# Patient Record
Sex: MALE | Race: WHITE | Employment: OTHER | URBAN - METROPOLITAN AREA
[De-identification: names, ages, dates, MRNs, and addresses within clinical notes are randomized per-mention and may not be internally consistent; named-entity substitution may affect disease eponyms.]

---

## 2020-07-08 ENCOUNTER — APPOINTMENT (OUTPATIENT)
Dept: GENERAL RADIOLOGY | Facility: CLINIC | Age: 70
End: 2020-07-08
Attending: EMERGENCY MEDICINE
Payer: MEDICARE

## 2020-07-08 ENCOUNTER — HOSPITAL ENCOUNTER (OUTPATIENT)
Facility: CLINIC | Age: 70
Setting detail: OBSERVATION
Discharge: HOME | End: 2020-07-09
Attending: EMERGENCY MEDICINE | Admitting: HOSPITALIST
Payer: MEDICARE

## 2020-07-08 DIAGNOSIS — R55 SYNCOPE, UNSPECIFIED SYNCOPE TYPE: ICD-10-CM

## 2020-07-08 DIAGNOSIS — Z95.1 STATUS POST CORONARY ARTERY BYPASS GRAFT: ICD-10-CM

## 2020-07-08 DIAGNOSIS — Z79.01 LONG TERM CURRENT USE OF ANTICOAGULANT THERAPY: ICD-10-CM

## 2020-07-08 PROBLEM — E11.8 CONTROLLED TYPE 2 DIABETES MELLITUS WITH COMPLICATION, WITHOUT LONG-TERM CURRENT USE OF INSULIN (H): Status: ACTIVE | Noted: 2017-06-08

## 2020-07-08 LAB
ANION GAP SERPL CALCULATED.3IONS-SCNC: 4 MMOL/L (ref 3–14)
BASOPHILS # BLD AUTO: 0 10E9/L (ref 0–0.2)
BASOPHILS NFR BLD AUTO: 0.3 %
BUN SERPL-MCNC: 22 MG/DL (ref 7–30)
CALCIUM SERPL-MCNC: 9.4 MG/DL (ref 8.5–10.1)
CHLORIDE SERPL-SCNC: 109 MMOL/L (ref 94–109)
CO2 SERPL-SCNC: 24 MMOL/L (ref 20–32)
CREAT SERPL-MCNC: 1.35 MG/DL (ref 0.66–1.25)
DIFFERENTIAL METHOD BLD: ABNORMAL
EOSINOPHIL # BLD AUTO: 0.1 10E9/L (ref 0–0.7)
EOSINOPHIL NFR BLD AUTO: 1.3 %
ERYTHROCYTE [DISTWIDTH] IN BLOOD BY AUTOMATED COUNT: 13.3 % (ref 10–15)
GFR SERPL CREATININE-BSD FRML MDRD: 52 ML/MIN/{1.73_M2}
GLUCOSE SERPL-MCNC: 141 MG/DL (ref 70–99)
HCT VFR BLD AUTO: 47.8 % (ref 40–53)
HGB BLD-MCNC: 15.6 G/DL (ref 13.3–17.7)
IMM GRANULOCYTES # BLD: 0 10E9/L (ref 0–0.4)
IMM GRANULOCYTES NFR BLD: 0.3 %
INR PPP: 3 (ref 0.86–1.14)
INTERPRETATION ECG - MUSE: NORMAL
LYMPHOCYTES # BLD AUTO: 1.2 10E9/L (ref 0.8–5.3)
LYMPHOCYTES NFR BLD AUTO: 18.5 %
MCH RBC QN AUTO: 29.4 PG (ref 26.5–33)
MCHC RBC AUTO-ENTMCNC: 32.6 G/DL (ref 31.5–36.5)
MCV RBC AUTO: 90 FL (ref 78–100)
MONOCYTES # BLD AUTO: 0.5 10E9/L (ref 0–1.3)
MONOCYTES NFR BLD AUTO: 8.1 %
NEUTROPHILS # BLD AUTO: 4.4 10E9/L (ref 1.6–8.3)
NEUTROPHILS NFR BLD AUTO: 71.5 %
NRBC # BLD AUTO: 0 10*3/UL
NRBC BLD AUTO-RTO: 0 /100
PLATELET # BLD AUTO: 126 10E9/L (ref 150–450)
POTASSIUM SERPL-SCNC: 4.4 MMOL/L (ref 3.4–5.3)
RBC # BLD AUTO: 5.3 10E12/L (ref 4.4–5.9)
SODIUM SERPL-SCNC: 137 MMOL/L (ref 133–144)
TROPONIN I SERPL-MCNC: <0.015 UG/L (ref 0–0.04)
WBC # BLD AUTO: 6.2 10E9/L (ref 4–11)

## 2020-07-08 PROCEDURE — 99220 ZZC INITIAL OBSERVATION CARE,LEVL III: CPT | Performed by: PHYSICIAN ASSISTANT

## 2020-07-08 PROCEDURE — 99285 EMERGENCY DEPT VISIT HI MDM: CPT | Mod: 25

## 2020-07-08 PROCEDURE — 96360 HYDRATION IV INFUSION INIT: CPT

## 2020-07-08 PROCEDURE — 25800030 ZZH RX IP 258 OP 636: Performed by: EMERGENCY MEDICINE

## 2020-07-08 PROCEDURE — 93005 ELECTROCARDIOGRAM TRACING: CPT

## 2020-07-08 PROCEDURE — 85610 PROTHROMBIN TIME: CPT | Performed by: EMERGENCY MEDICINE

## 2020-07-08 PROCEDURE — 84484 ASSAY OF TROPONIN QUANT: CPT | Mod: 91 | Performed by: PHYSICIAN ASSISTANT

## 2020-07-08 PROCEDURE — 96361 HYDRATE IV INFUSION ADD-ON: CPT

## 2020-07-08 PROCEDURE — C9803 HOPD COVID-19 SPEC COLLECT: HCPCS

## 2020-07-08 PROCEDURE — 25000132 ZZH RX MED GY IP 250 OP 250 PS 637: Performed by: PHYSICIAN ASSISTANT

## 2020-07-08 PROCEDURE — 25800030 ZZH RX IP 258 OP 636: Performed by: PHYSICIAN ASSISTANT

## 2020-07-08 PROCEDURE — 80048 BASIC METABOLIC PNL TOTAL CA: CPT | Performed by: EMERGENCY MEDICINE

## 2020-07-08 PROCEDURE — 71046 X-RAY EXAM CHEST 2 VIEWS: CPT

## 2020-07-08 PROCEDURE — U0003 INFECTIOUS AGENT DETECTION BY NUCLEIC ACID (DNA OR RNA); SEVERE ACUTE RESPIRATORY SYNDROME CORONAVIRUS 2 (SARS-COV-2) (CORONAVIRUS DISEASE [COVID-19]), AMPLIFIED PROBE TECHNIQUE, MAKING USE OF HIGH THROUGHPUT TECHNOLOGIES AS DESCRIBED BY CMS-2020-01-R: HCPCS | Performed by: EMERGENCY MEDICINE

## 2020-07-08 PROCEDURE — 84484 ASSAY OF TROPONIN QUANT: CPT | Performed by: EMERGENCY MEDICINE

## 2020-07-08 PROCEDURE — 36415 COLL VENOUS BLD VENIPUNCTURE: CPT | Performed by: PHYSICIAN ASSISTANT

## 2020-07-08 PROCEDURE — G0378 HOSPITAL OBSERVATION PER HR: HCPCS

## 2020-07-08 PROCEDURE — 85025 COMPLETE CBC W/AUTO DIFF WBC: CPT | Performed by: EMERGENCY MEDICINE

## 2020-07-08 RX ORDER — SODIUM CHLORIDE 9 MG/ML
INJECTION, SOLUTION INTRAVENOUS CONTINUOUS
Status: ACTIVE | OUTPATIENT
Start: 2020-07-08 | End: 2020-07-09

## 2020-07-08 RX ORDER — ONDANSETRON 2 MG/ML
4 INJECTION INTRAMUSCULAR; INTRAVENOUS EVERY 6 HOURS PRN
Status: DISCONTINUED | OUTPATIENT
Start: 2020-07-08 | End: 2020-07-09 | Stop reason: HOSPADM

## 2020-07-08 RX ORDER — GLIPIZIDE 2.5 MG/1
2.5 TABLET, EXTENDED RELEASE ORAL DAILY
Status: DISCONTINUED | OUTPATIENT
Start: 2020-07-09 | End: 2020-07-09 | Stop reason: HOSPADM

## 2020-07-08 RX ORDER — ASPIRIN 81 MG/1
81 TABLET ORAL DAILY
COMMUNITY

## 2020-07-08 RX ORDER — NICOTINE POLACRILEX 4 MG
15-30 LOZENGE BUCCAL
Status: DISCONTINUED | OUTPATIENT
Start: 2020-07-08 | End: 2020-07-09 | Stop reason: HOSPADM

## 2020-07-08 RX ORDER — TERAZOSIN 2 MG/1
2 CAPSULE ORAL AT BEDTIME
COMMUNITY

## 2020-07-08 RX ORDER — WARFARIN SODIUM 2.5 MG/1
3.75 TABLET ORAL AT BEDTIME
COMMUNITY

## 2020-07-08 RX ORDER — LISINOPRIL 20 MG/1
20 TABLET ORAL DAILY
COMMUNITY

## 2020-07-08 RX ORDER — TERAZOSIN 1 MG/1
2 CAPSULE ORAL AT BEDTIME
Status: DISCONTINUED | OUTPATIENT
Start: 2020-07-08 | End: 2020-07-09 | Stop reason: HOSPADM

## 2020-07-08 RX ORDER — GLIPIZIDE 2.5 MG/1
2.5 TABLET, EXTENDED RELEASE ORAL DAILY
COMMUNITY

## 2020-07-08 RX ORDER — ROSUVASTATIN CALCIUM 20 MG/1
40 TABLET, COATED ORAL EVERY EVENING
Status: DISCONTINUED | OUTPATIENT
Start: 2020-07-08 | End: 2020-07-09 | Stop reason: HOSPADM

## 2020-07-08 RX ORDER — ASPIRIN 81 MG/1
81 TABLET ORAL DAILY
Status: DISCONTINUED | OUTPATIENT
Start: 2020-07-09 | End: 2020-07-09 | Stop reason: HOSPADM

## 2020-07-08 RX ORDER — ATORVASTATIN CALCIUM 40 MG/1
40 TABLET, FILM COATED ORAL AT BEDTIME
COMMUNITY

## 2020-07-08 RX ORDER — WARFARIN SODIUM 2.5 MG/1
TABLET ORAL
COMMUNITY
Start: 2020-06-15 | End: 2020-07-08

## 2020-07-08 RX ORDER — ATORVASTATIN CALCIUM 40 MG/1
40 TABLET, FILM COATED ORAL AT BEDTIME
Status: DISCONTINUED | OUTPATIENT
Start: 2020-07-08 | End: 2020-07-08

## 2020-07-08 RX ORDER — WARFARIN SODIUM 2.5 MG/1
2.5 TABLET ORAL AT BEDTIME
COMMUNITY

## 2020-07-08 RX ORDER — NITROGLYCERIN 0.4 MG/1
0.4 TABLET SUBLINGUAL EVERY 5 MIN PRN
Status: DISCONTINUED | OUTPATIENT
Start: 2020-07-08 | End: 2020-07-09 | Stop reason: HOSPADM

## 2020-07-08 RX ORDER — LISINOPRIL 20 MG/1
20 TABLET ORAL DAILY
Status: DISCONTINUED | OUTPATIENT
Start: 2020-07-09 | End: 2020-07-09 | Stop reason: HOSPADM

## 2020-07-08 RX ORDER — DEXTROSE MONOHYDRATE 25 G/50ML
25-50 INJECTION, SOLUTION INTRAVENOUS
Status: DISCONTINUED | OUTPATIENT
Start: 2020-07-08 | End: 2020-07-09 | Stop reason: HOSPADM

## 2020-07-08 RX ORDER — ACETAMINOPHEN 325 MG/1
650 TABLET ORAL EVERY 4 HOURS PRN
Status: DISCONTINUED | OUTPATIENT
Start: 2020-07-08 | End: 2020-07-09 | Stop reason: HOSPADM

## 2020-07-08 RX ORDER — ONDANSETRON 4 MG/1
4 TABLET, ORALLY DISINTEGRATING ORAL EVERY 6 HOURS PRN
Status: DISCONTINUED | OUTPATIENT
Start: 2020-07-08 | End: 2020-07-09 | Stop reason: HOSPADM

## 2020-07-08 RX ORDER — LIDOCAINE 40 MG/G
CREAM TOPICAL
Status: DISCONTINUED | OUTPATIENT
Start: 2020-07-08 | End: 2020-07-09

## 2020-07-08 RX ADMIN — SODIUM CHLORIDE 500 ML: 9 INJECTION, SOLUTION INTRAVENOUS at 13:21

## 2020-07-08 RX ADMIN — WARFARIN SODIUM 3.5 MG: 2.5 TABLET ORAL at 21:44

## 2020-07-08 RX ADMIN — SODIUM CHLORIDE: 9 INJECTION, SOLUTION INTRAVENOUS at 16:54

## 2020-07-08 RX ADMIN — SODIUM CHLORIDE 500 ML: 9 INJECTION, SOLUTION INTRAVENOUS at 14:01

## 2020-07-08 RX ADMIN — ROSUVASTATIN CALCIUM 40 MG: 20 TABLET, FILM COATED ORAL at 21:43

## 2020-07-08 RX ADMIN — TERAZOSIN HYDROCHLORIDE ANHYDROUS 2 MG: 1 CAPSULE ORAL at 21:44

## 2020-07-08 ASSESSMENT — ENCOUNTER SYMPTOMS
PALPITATIONS: 0
VOMITING: 0
SHORTNESS OF BREATH: 0
DIAPHORESIS: 1
ACTIVITY CHANGE: 1
SEIZURES: 0
HEADACHES: 0

## 2020-07-08 NOTE — PLAN OF CARE
PRIMARY DIAGNOSIS: SYNCOPE  OUTPATIENT/OBSERVATION GOALS TO BE MET BEFORE DISCHARGE:  1. Orthostatic performed: Yes:          Lying Orthostatic BP: 138/66         Sitting Orthostatic BP: 131/116         Standing Orthostatic BP: 139/72     2. Diagnostic testing complete & at baseline neurologic testing: N/A    3. Cleared by consultants (if involved): N/A    4. Interpretation of cardiac rhythm per telemetry tech: SR w/1st degree AVB    5. Tolerating adequate PO diet and medications: Yes    6. Return to near baseline physical activity or neurologic status: Yes    Discharge Planner Nurse   Safe discharge environment identified: Yes  Barriers to discharge: No       Entered by: Lexis Gallardo 07/08/2020 6:52 PM     Please review provider order for any additional goals.   Nurse to notify provider when observation goals have been met and patient is ready for discharge.    VSS, A/O. SBA.  NS infusing @ 100ml/h.  Echo ordered.  Trops negative so far, next one at 2100.  Pt denies CP/SOB/Pain/Nausea.  Denies dizziness/lightheadedness.  Tele- SR with 1st DAVB.  Pt typically uses CPAP at HS, will use NC tonight.

## 2020-07-08 NOTE — H&P
[facility] Outpatient / Observation Unit  History and Physical Exam     Carlos Puente II MRN# [id number]   YOB: 1950 Age: 70 year old      Date of Admission:  7/8/2020    Primary care provider: No primary care provider on file.          Assessment   Carlos Puente II is a 70 year old male with a PMH significant for DM, HTN, HLP, CAD s/p 3v CABG in 2014, GERD, CKD, hx of PE/DVT on coumadin and hx of ALBERTA, who presented to the Emergency Room with an episode of syncope.   Work up in the ED reveals: VSS. T 99. BMP shows baseline Cr of 1.35 otherwise unremarkable. Troponin is undetectable. CBC is unremarkable. INR is 3.0. EKG shows SR with 1st degree AV block, RBBB and poss inferior infarct. CXR shows mild vascular congestion, otherwise negative for acute process.   Patient will be registered to Observation for further work-up and evaluation.     1. Syncope - episode yesterday and felt off today. Syncopal episode occurred after working in garage/heat for several hours, came in for a cool shower, asymptomatic at that time. Sat down and drank cold water, then passed out. Denies preceding chest pain, SOB, palpations, or lightheadedness. Felt fine later in the evening. Today, felt off but didn't pass out. Will monitor on tele for arrhythmia, will trend troponins and get an echocardiogram to assess structure and function. If work up is unremarkable, recommend cardiac event monitor on discharge and consider follow up with primary cardiologist for further recommendations.   2. DM - last HgbA1c was 5.9 on 6/12/20. Managed on glipizide, resume  3. HTN - BPs are stable, orthostatics on admission were negative. On Hytrin and lisinopril at home, resume with parameters.  4. CKD - baseline Cr is ~1.5, today it 1.35. avoid nephrotoxins if able  5. Hx of PE/DVT - hx of massive PE in 2002, has been anticoagulated with coumadin since. Continue coumadin per pharmacy   6. HLP - resume statin  7. GERD - resume PPI  8. ALBERTA -  uses CPAP  9. COVID-19 - test done for admission during pandemic. Pending          Plan     1. Registered to Observation  2. Continue telemetry monitoring  3. Serial troponins   4. IV hydration with Normal saline, @, 100 ml/hr for 10 hrs  5. Obtain ECHO to evaluate for valvular dysfunction and assess for LV function  6. DVT prophylaxis: pt at low risk, encourage ambulation                   Chief Complaint:   Syncopal episode         History of Present Illness:   Carlos Puente II is a 70 year old male with a PMH significant for DM, HTN, HLP, CAD s/p 3v CABG in 2014, GERD, CKD, hx of PE/DVT on coumadin and hx of ALBERTA, who presented to the Emergency Room with an episode of syncope. Pt reports yesterday he was working in the garage for several hours. He was profusely sweating and hot but otherwise felt fine. He came in to take a cool shower to cool down and felt fine throughout this. He sat down and drank cold water and passed out. Per ED note, wife reports he was out for several minutes. He states he woke up excessively sweaty but otherwise felt fine. He denies preceding or following chest pain, SOB, palpitations, lightheadedness or nausea. He states he felt back to normal later in the evening. Today, he notes feeling off again with diaphoresis and his wife states he was pale. This prompted them to come to the ED. He again denies chest pain, SOB or palpations. He states in the days prior, he denies fever, chills, chest pain, cough, SOB, decreasing exercise tolerance, abd pain, nausea, vomiting, diarrhea or dysuria. He notes hx of 3v CABG in 2014 and hx of massive PE in 2002 at which time, he also suffered cardiac arrest due to this in the ED.               Past Medical History:   HTN  HLP  CAD  CKD  DVT/PE  GERD  ALBERTA          Past Surgical History:   3v CABG in 2014  R LUIS M          Social History:     Social History     Socioeconomic History     Marital status:      Spouse name: Not on file     Number of  "children: Not on file     Years of education: Not on file     Highest education level: Not on file   Occupational History     Not on file   Social Needs     Financial resource strain: Not on file     Food insecurity     Worry: Not on file     Inability: Not on file     Transportation needs     Medical: Not on file     Non-medical: Not on file   Tobacco Use     Smoking status: Former Smoker     Last attempt to quit: 2000     Years since quittin.5     Smokeless tobacco: Never Used   Substance and Sexual Activity     Alcohol use: Yes     Alcohol/week: 7.0 standard drinks     Types: 7 Cans of beer per week     Drug use: Never     Sexual activity: Not on file   Lifestyle     Physical activity     Days per week: Not on file     Minutes per session: Not on file     Stress: Not on file   Relationships     Social connections     Talks on phone: Not on file     Gets together: Not on file     Attends Alevism service: Not on file     Active member of club or organization: Not on file     Attends meetings of clubs or organizations: Not on file     Relationship status: Not on file     Intimate partner violence     Fear of current or ex partner: Not on file     Emotionally abused: Not on file     Physically abused: Not on file     Forced sexual activity: Not on file   Other Topics Concern     Not on file   Social History Narrative     Not on file               Family History:   Father - HTN, CVA  Brother - HTN         Allergies:      Allergies   Allergen Reactions     Atorvastatin      Other reaction(s): Muscle Weakness  PER PATIENT,WAS TOLD IT WAS EATING HIS MUSCLES , classic side effect of statins not an allergy     Penicillins      Other reaction(s): *Unknown - Childhood Rxn  Pt states,  \" he was told that when he was born he got a dose and had some stanley reaction;  Was told he was allergic to it.\"     Simvastatin Other (See Comments)     Weakness \" couldn't get out of chair; no muscle strength\" pt states. Side effect " NOT an allergy               Medications:     Prior to Admission medications    Medication Sig Last Dose Taking? Auth Provider   aspirin 81 MG EC tablet Take 81 mg by mouth daily 7/8/2020 at am Yes Unknown, Entered By History   atorvastatin (LIPITOR) 40 MG tablet Take 40 mg by mouth At Bedtime 7/7/2020 at pm Yes Unknown, Entered By History   blood glucose (NO BRAND SPECIFIED) test strip USE AS DIRECTED  Yes Reported, Patient   cholecalciferol (VITAMIN D3) 25 mcg (1000 units) capsule Take 1 capsule by mouth daily 7/8/2020 at am Yes Unknown, Entered By History   glipiZIDE (GLUCOTROL XL) 2.5 MG 24 hr tablet Take 2.5 mg by mouth daily 7/8/2020 at am Yes Unknown, Entered By History   lisinopril (ZESTRIL) 20 MG tablet Take 20 mg by mouth daily 7/8/2020 at am Yes Unknown, Entered By History   omeprazole (PRILOSEC) 20 MG DR capsule Take 20 mg by mouth daily 7/8/2020 at am Yes Unknown, Entered By History   terazosin (HYTRIN) 2 MG capsule Take 2 mg by mouth At Bedtime 7/7/2020 at pm Yes Unknown, Entered By History   warfarin ANTICOAGULANT (COUMADIN) 2.5 MG tablet Take 2.5 mg by mouth At Bedtime On Mondays and Fridays 7/6/2020 Yes Unknown, Entered By History   warfarin ANTICOAGULANT (COUMADIN) 2.5 MG tablet Take 3.75 mg by mouth At Bedtime All other days 7/7/2020 at pm Yes Unknown, Entered By History              Review of Systems:   A Comprehensive greater than 10 system review of systems was carried out.  Pertinent positives and negatives are noted above.  Otherwise negative for contributory information.     CV: NEGATIVE for chest pain, palpitations or peripheral edema  C: NEGATIVE for fever, chills, change in weight  E/M: NEGATIVE for ear, mouth and throat problems  R: NEGATIVE for significant cough or SOB             Physical Exam:   Blood pressure (!) 143/86, pulse 55, temperature 99  F (37.2  C), temperature source Oral, resp. rate 12, weight 96.2 kg (212 lb), SpO2 99 %.  ORTHOSTATIC BP: negative    GENERAL:   Comfortable.  PSYCH: pleasant, oriented, No acute distress.  HEENT:  Atraumatic, normocephalic. Normal conjunctiva, normal hearing, and oropharynx is normal.  NECK:  Supple, no neck vein distention.  HEART:  Normal S1, S2 with no murmur, no pericardial rub, gallops or S3 or S4.  LUNGS:  Clear to auscultation, normal Respiratory effort. No wheezing, rales or ronchi.  GI:  Soft, normal bowel sounds. Non-tender, non distended.   EXTREMITIES:  No pedal edema, +2 pulses bilateral and equal.  SKIN:  Dry to touch, No rash, wound or ulcerations.  NEUROLOGIC:  CN 2-12 intact, BL 5/5 symmetric upper and lower extremity strength, sensation is intact with no focal deficits.             Data:     EKG demonstrates:  Sinus Rhythm with 1st degree AV block, Right Bundle Branch Block, poss inferior infarct    Recent Labs   Lab 07/08/20  1249   WBC 6.2   HGB 15.6   HCT 47.8   MCV 90   *     Recent Labs   Lab 07/08/20  1249      POTASSIUM 4.4   CHLORIDE 109   CO2 24   ANIONGAP 4   *   BUN 22   CR 1.35*   GFRESTIMATED 52*   GFRESTBLACK 61   ANABELL 9.4     Recent Labs   Lab 07/08/20  1249   INR 3.00*     Recent Labs   Lab 07/08/20  1249   TROPI <0.015       Recent Results (from the past 48 hour(s))   XR Chest 2 Views    Narrative    XR CHEST 2 VW 7/8/2020 1:09 PM    HISTORY: syncope    COMPARISON: None.      Impression    IMPRESSION: Mild pulmonary venous congestion. No consolidation. No  pleural effusions. No pneumothorax. Normal cardiac size. Median  sternotomy.    MD Olamide VELASQUEZ PA-C

## 2020-07-08 NOTE — ED PROVIDER NOTES
"  History   Chief Complaint:  Loss of Consciousness     The history is provided by the patient and the spouse.      Carlos Puente II is a 70 year old male with history of type 2 diabetes mellitus, hypertension, hyperlipidemia, Chronic Kidney Disease, and DVT/pulmonary embolism on Warfarin status post CABG who presents with loss of consciousness. The patient was out in the garage yesterday for several hours. He came into the house at 1430 and took a shower. He then sat down to sip water when he suddenly had a syncopal event. The wife notes prior to collapsing he states that he was making noises like he was going to vomit and then he collapsed. She notes that he was pale, heavily diaphoretic, and was unconscious  for about 4 minutes. He was in a chair and did not fall or sustain injury.  The patient denies chest pain, palpitations, or shortness of breath prior to the event. He also denies headache or vomiting today, but the wife noted the patient had another near syncopal episode today where he became very pale and \"spacey.\" Carlos did have a syncopal event 2 years ago and had an associated seizure witnessed by EMS. The patient was seen at [place] at that time. He is anticoagulated on Warfarin after multiple blood clots and had several episodes of epistaxis in the last several weeks which is unusual for him.    Allergies:  Atorvastatin  Penicillins  Simvastatin    Medications:   Warfarin    Past Medical History:    Type 2 diabetes mellitus   Obstructive sleep apnea  Erythema migrans   Pulmonary embolism  DVT  NSTEMI  Osteoarthritis of hip  Neoplasm of colon   Chronic Kidney Disease  Hypertension  Hyperlipidemia      Past Surgical History:    CABG     Family History:    History reviewed. No pertinent family history.    Social History:  The patient was accompanied to the ED by wife.  Smoking Status: former smoker  Smokeless Tobacco: Never Used  Alcohol Use: Yes  Drug Use: Never    Review of Systems   Constitutional: " Positive for activity change and diaphoresis.   Respiratory: Negative for shortness of breath.    Cardiovascular: Negative for chest pain and palpitations.   Gastrointestinal: Negative for vomiting.   Skin: Positive for pallor.   Neurological: Negative for seizures and headaches.        Loss of consciousness    All other systems reviewed and are negative.    Physical Exam     Patient Vitals for the past 24 hrs:   BP Temp Temp src Pulse Heart Rate Resp SpO2 Weight   07/08/20 1400 (!) 143/86 -- -- 55 55 12 99 % --   07/08/20 1300 119/75 -- -- 56 55 16 98 % --   07/08/20 1228 (!) 166/79 99  F (37.2  C) Oral 78 78 20 99 % 96.2 kg (212 lb)       Physical Exam    Eyes:               Sclera white; Pupils are equal and round  ENT:                External ears and nares normal  CV:                  Rate as above with regular rhythm                           Sternotomy scar                          No BLE edema  Resp:               Breath sounds clear and equal bilaterally  GI:                   Abdomen is soft, non-tender  MS:                  Moves all extremities  Skin:                Warm and dry  Neuro:             Speech is normal and fluent. Alert.    Emergency Department Course   ECG:  ECG taken at 1236, ECG read at 1236  Sinus rhythm with 1st degree AV block  Right bundle branch block   Possible inferior infarct, age undetermined  Abnormal ECG  Rate 63 bpm. ND interval 266 ms. QRS duration 130 ms. QT/QTc 416/425 ms. P-R-T axes 52 48 -13.    Laboratory:  Laboratory findings were communicated with the patient who voiced understanding of the findings.    CBC: WBC 6.2, HGB 15.6, (L)  BMP: glucose 141(H), Creatinine 1.35(H), GFR estimate 52(L) o/w WNL  Troponin (Collected 1249): <0.015  INR: 3.00(H)    COVID19 Virus PCR by nasopharyngeal swab pending     Imaging   Imaging findings were communicated with the patient who voiced understanding of the findings.    XR Chest 2 Views   IMPRESSION: Mild pulmonary venous  congestion. No consolidation. No   pleural effusions. No pneumothorax. Normal cardiac size. Median   sternotomy.   Reading per radiology     Interventions:  1321  mL IV  1401  mL IV    Emergency Department Course:  Nursing notes and vitals reviewed.    1240 I performed an exam of the patient as documented above.     IV was inserted and blood was drawn for laboratory testing, results above.     1255 Orthostatics:   Lying Orthostatic BP: 138/66 Lying Orthostatic Pulse: 56 bpm    Sitting Orthostatic BP: 131/116 Sitting Orthostatic Pulse: 64 bpm    Standing Orthostatic BP: 139/72 Standing Orthostatic Pulse: 69 bpm     The patient was sent for a XR Chest while in the emergency department, results above.      1335 I rechecked the patient. Explained findings to the Patient.    1418 I spoke with Olamide Art for Dr. Caballero of the Hospitalist service from [facility] regarding patient's presentation, findings, and plan of care.     Findings and plan explained to the Patient who consents to admission. Discussed the patient with Dr. Caballero, who will admit the patient to a observation bed for further monitoring, evaluation, and treatment.     Impression & Plan    Covid-19  Carlos Puente II was evaluated during a global COVID-19 pandemic, which necessitated consideration that the patient might be at risk for infection with the SARS-CoV-2 virus that causes COVID-19.   Applicable protocols for evaluation were followed during the patient's care.   COVID-19 was considered as part of the patient's evaluation. The plan for testing is:  a test was obtained during this visit.    Medical Decision Making:  Carlos Puente II is a 70 year old male who presents to the emergency department today for evaluation of syncope yesterday and near syncope today. An isolated event heat exposure or dehydration was a consideration except for the fact that symptoms happened well after he had returned to a cool environment and  recurred a day later. Blood work shows no evidence of anemia, electrolyte abnormalities, or renal insufficiency. There was no evidence of dysrhythmia or acute coronary syndrome. There is concern for possibility of cardiac dysrhythmia and underlying cardiac etiology with these symptoms, so further cardiac evaluation in the hospital was recommended. He and his wife have concerns about observation stay and are checking with their insurance after the discussion I had with them.  Admission arranged.    Diagnosis:    ICD-10-CM    1. Syncope, unspecified syncope type  R55    2. Status post coronary artery bypass graft  Z95.1    3. Long term current use of anticoagulant therapy  Z79.01        Disposition:   The patient is admitted into the care of Dr. Caballero.    Scribe Disclosure:  I, Lesli Peoples, am serving as a scribe at 12:36 PM on 7/8/2020 to document services personally performed by Susana Alvarado MD based on my observations and the provider's statements to me.   [facility] EMERGENCY DEPARTMENT       Susana Alvarado MD  07/08/20 [id number]

## 2020-07-08 NOTE — ED TRIAGE NOTES
Presents with syncopal episode yesterday after working in the garage for several hours. Spouse reports pt became pale and diaphoretic and unconscious for about 4 minutes before he woke up. Pt presents today pale with increased weakness. ABCs intact. VSS on RA.

## 2020-07-08 NOTE — ED NOTES
"[facility]  ED Nurse Handoff Report    Carlos Puente II is a 70 year old male   ED Chief complaint: Loss of Consciousness  . ED Diagnosis:   Final diagnoses:   Syncope, unspecified syncope type   Status post coronary artery bypass graft   Long term current use of anticoagulant therapy     Allergies:   Allergies   Allergen Reactions     Atorvastatin      Other reaction(s): Muscle Weakness  PER PATIENT,WAS TOLD IT WAS EATING HIS MUSCLES , classic side effect of statins not an allergy     Penicillins      Other reaction(s): *Unknown - Childhood Rxn  Pt states,  \" he was told that when he was born he got a dose and had some stanley reaction;  Was told he was allergic to it.\"     Simvastatin Other (See Comments)     Weakness \" couldn't get out of chair; no muscle strength\" pt states. Side effect NOT an allergy       Code Status: Full Code  Activity level - Baseline/Home:  Independent. Activity Level - Current:   Stand by Assist. Lift room needed: No. Bariatric: No   Needed: No   Isolation: No. Infection: Not Applicable.     Vital Signs:   Vitals:    07/08/20 1228 07/08/20 1300   BP: (!) 166/79 119/75   Pulse: 78 56   Resp: 20 16   Temp: 99  F (37.2  C)    TempSrc: Oral    SpO2: 99% 98%   Weight: 96.2 kg (212 lb)        Cardiac Rhythm:  ,      Pain level: 0-10 Pain Scale: 0  Patient confused: No. Patient Falls Risk: Yes.   Elimination Status: Has voided   Patient Report - Initial Complaint: LOC, syncopyx2. Focused Assessment:     12:26 ED Triage Notes Filed Presents with syncopal episode yesterday after working in the garage for several hours. Spouse reports pt became pale and diaphoretic and unconscious for about 4 minutes before he woke up. Pt presents today pale with increased weakness. ABCs intact. VSS on RA.    Carlos Puente II is a 70 year old male with history of type 2 diabetes mellitus, hypertension, hyperlipidemia, Chronic Kidney Disease, and DVT/pulmonary embolism on Warfarin status " "post CABG who presents with loss of consciousness. The patient was out in the garage yesterday for several hours. He came into the house at 1430 and took a shower. He then sat down to sip water when he suddenly had a syncopal event. The wife notes prior to collapsing he states that he was making noises like he was going to vomit and then he collapsed. She notes that he was pale, heavily diaphoretic, and was unconscious  for about 4 minutes. The patient denies chest pain, palpitations, or shortness of breath prior to the event. He also denies headache or vomiting today, but the wife noted the patient had another near syncopal episode today where he became very pale and \"spacy.\" Carlos did have a syncopal event 2 years ago and had an associated seizure witnessed by EMS. The patient was seen at [facility] at that time. He is anticoagulated on Warfarin after multiple blood clots and had several episodes of epistaxis in the last several weeks which is unusual for him   Denies SOB,   2 LOC at home, hx Full cardiac arrest, MI and CABG.   LS clear, Denies CP. On coumadin      Eyes:               Sclera white; Pupils are equal and round  ENT:                External ears and nares normal  CV:                  Rate as above with regular rhythm                           Sternotomy scar                          No BLE edema  Resp:               Breath sounds clear and equal bilaterally  GI:                   Abdomen is soft, non-tender  MS:                  Moves all extremities  Skin:                Warm and dry  Neuro:             Speech is normal and fluent. Alert.                         Tests Performed: labs, imaging. Abnormal Results:   Labs Ordered and Resulted from Time of ED Arrival Up to the Time of Departure from the ED   CBC WITH PLATELETS DIFFERENTIAL - Abnormal; Notable for the following components:       Result Value    Platelet Count 126 (*)     All other components within normal limits   INR - Abnormal; Notable for " the following components:    INR 3.00 (*)     All other components within normal limits   BASIC METABOLIC PANEL - Abnormal; Notable for the following components:    Glucose 141 (*)     Creatinine 1.35 (*)     GFR Estimate 52 (*)     All other components within normal limits   TROPONIN I   PERIPHERAL IV CATHETER   ORTHOSTATIC BLOOD PRESSURE AND PULSE   CARDIAC CONTINUOUS MONITORING     XR Chest 2 Views   Final Result   IMPRESSION: Mild pulmonary venous congestion. No consolidation. No   pleural effusions. No pneumothorax. Normal cardiac size. Median   sternotomy.      MOUSTAPHA PALMA MD        .   Treatments provided: monitoring, fluids  Family Comments: spouse at bedside  OBS brochure/video discussed/provided to patient:  Yes  ED Medications:   Medications   0.9% sodium chloride BOLUS (500 mLs Intravenous New Bag 7/8/20 1321)   0.9% sodium chloride BOLUS (has no administration in time range)     Drips infusing:  No  For the majority of the shift, the patient's behavior Green. Interventions performed were n/a.    Sepsis treatment initiated: No       ED Nurse Name/Phone Number: Chhaya Ellis RN,   1:55 PM     RECEIVING UNIT ED HANDOFF REVIEW    Above ED Nurse Handoff Report was reviewed: Yes  Reviewed by: Lexis Gallardo RN on July 8, 2020 at 3:16 PM

## 2020-07-08 NOTE — PHARMACY-ANTICOAGULATION SERVICE
Clinical Pharmacy - Warfarin Dosing Consult     Pharmacy has been consulted to manage this patient s warfarin therapy.  Indication: DVT/ PE Treatment  Therapy Goal: INR 2-3  Warfarin Prior to Admission: Yes  Warfarin PTA Regimen: 2.5 mg M,F and 3.75 mg ROW  Significant drug interactions: Aspirin  Dose Comments: INR 3.0, warfarin 3.5 mg today    INR   Date Value Ref Range Status   07/08/2020 3.00 (H) 0.86 - 1.14 Final       Recommend warfarin 3.5 mg today.  Pharmacy will monitor Carlos CHRISTIANO QuintanaKwame II daily and order warfarin doses to achieve specified goal.      Please contact pharmacy as soon as possible if the warfarin needs to be held for a procedure or if the warfarin goals change.

## 2020-07-08 NOTE — PHARMACY-ADMISSION MEDICATION HISTORY
Admission medication history interview status for this patient is complete. See [facility] admission navigator for allergy information, prior to admission medications and immunization status.     Medication history interview done via telephone during Covid-19 pandemic, indicate source(s): Patient  Medication history resources (including written lists, pill bottles, clinic record): care everywhere    Changes made to PTA medication list:  Added: glipizide, terazosin, lisinopril, atorvastatin, omeprazole, aspirin, vitamin d  Deleted: nothing  Changed: split up warfarin dose    Actions taken by pharmacist (provider contacted, etc):None     Additional medication history information: patient confirmed he takes 1 whole 2.5 warfarin tablet on Monday and Friday and takes 1.5 tablets all the other days at bedtime.    Medication reconciliation/reorder completed by provider prior to medication history?  N   (Y/N)     For patients on insulin therapy: N  (Y/N)      Prior to Admission medications    Medication Sig Last Dose Taking? Auth Provider   aspirin 81 MG EC tablet Take 81 mg by mouth daily 7/8/2020 at am Yes Unknown, Entered By History   atorvastatin (LIPITOR) 40 MG tablet Take 40 mg by mouth At Bedtime 7/7/2020 at pm Yes Unknown, Entered By History   blood glucose (NO BRAND SPECIFIED) test strip USE AS DIRECTED  Yes Reported, Patient   cholecalciferol (VITAMIN D3) 25 mcg (1000 units) capsule Take 1 capsule by mouth daily 7/8/2020 at am Yes Unknown, Entered By History   glipiZIDE (GLUCOTROL XL) 2.5 MG 24 hr tablet Take 2.5 mg by mouth daily 7/8/2020 at am Yes Unknown, Entered By History   lisinopril (ZESTRIL) 20 MG tablet Take 20 mg by mouth daily 7/8/2020 at am Yes Unknown, Entered By History   omeprazole (PRILOSEC) 20 MG DR capsule Take 20 mg by mouth daily 7/8/2020 at am Yes Unknown, Entered By History   terazosin (HYTRIN) 2 MG capsule Take 2 mg by mouth At Bedtime 7/7/2020 at pm Yes Unknown, Entered By History   warfarin  ANTICOAGULANT (COUMADIN) 2.5 MG tablet Take 2.5 mg by mouth At Bedtime On Mondays and Fridays 7/6/2020 Yes Unknown, Entered By History   warfarin ANTICOAGULANT (COUMADIN) 2.5 MG tablet Take 3.75 mg by mouth At Bedtime All other days 7/7/2020 at pm Yes Unknown, Entered By History

## 2020-07-09 ENCOUNTER — APPOINTMENT (OUTPATIENT)
Dept: CARDIOLOGY | Facility: CLINIC | Age: 70
End: 2020-07-09
Attending: PHYSICIAN ASSISTANT
Payer: MEDICARE

## 2020-07-09 VITALS
HEART RATE: 55 BPM | TEMPERATURE: 96 F | SYSTOLIC BLOOD PRESSURE: 135 MMHG | OXYGEN SATURATION: 97 % | WEIGHT: 212 LBS | RESPIRATION RATE: 20 BRPM | DIASTOLIC BLOOD PRESSURE: 74 MMHG

## 2020-07-09 LAB
ANION GAP SERPL CALCULATED.3IONS-SCNC: 4 MMOL/L (ref 3–14)
BUN SERPL-MCNC: 23 MG/DL (ref 7–30)
CALCIUM SERPL-MCNC: 8.8 MG/DL (ref 8.5–10.1)
CHLORIDE SERPL-SCNC: 111 MMOL/L (ref 94–109)
CO2 SERPL-SCNC: 24 MMOL/L (ref 20–32)
CREAT SERPL-MCNC: 1.45 MG/DL (ref 0.66–1.25)
GFR SERPL CREATININE-BSD FRML MDRD: 48 ML/MIN/{1.73_M2}
GLUCOSE SERPL-MCNC: 134 MG/DL (ref 70–99)
INR PPP: 3.47 (ref 0.86–1.14)
POTASSIUM SERPL-SCNC: 4.5 MMOL/L (ref 3.4–5.3)
SARS-COV-2 RNA SPEC QL NAA+PROBE: NOT DETECTED
SODIUM SERPL-SCNC: 139 MMOL/L (ref 133–144)
SPECIMEN SOURCE: NORMAL

## 2020-07-09 PROCEDURE — 99217 ZZC OBSERVATION CARE DISCHARGE: CPT | Performed by: PHYSICIAN ASSISTANT

## 2020-07-09 PROCEDURE — 25500064 ZZH RX 255 OP 636: Performed by: HOSPITALIST

## 2020-07-09 PROCEDURE — 85610 PROTHROMBIN TIME: CPT | Performed by: PHYSICIAN ASSISTANT

## 2020-07-09 PROCEDURE — G0378 HOSPITAL OBSERVATION PER HR: HCPCS

## 2020-07-09 PROCEDURE — 93306 TTE W/DOPPLER COMPLETE: CPT | Mod: 26 | Performed by: INTERNAL MEDICINE

## 2020-07-09 PROCEDURE — 36415 COLL VENOUS BLD VENIPUNCTURE: CPT | Performed by: PHYSICIAN ASSISTANT

## 2020-07-09 PROCEDURE — 93306 TTE W/DOPPLER COMPLETE: CPT

## 2020-07-09 PROCEDURE — 25000132 ZZH RX MED GY IP 250 OP 250 PS 637: Mod: GY | Performed by: PHYSICIAN ASSISTANT

## 2020-07-09 PROCEDURE — 80048 BASIC METABOLIC PNL TOTAL CA: CPT | Performed by: PHYSICIAN ASSISTANT

## 2020-07-09 PROCEDURE — 96361 HYDRATE IV INFUSION ADD-ON: CPT

## 2020-07-09 RX ADMIN — ASPIRIN 81 MG: 81 TABLET, COATED ORAL at 09:08

## 2020-07-09 RX ADMIN — LISINOPRIL 20 MG: 20 TABLET ORAL at 09:08

## 2020-07-09 RX ADMIN — HUMAN ALBUMIN MICROSPHERES AND PERFLUTREN 2 ML: 10; .22 INJECTION, SOLUTION INTRAVENOUS at 08:17

## 2020-07-09 RX ADMIN — GLIPIZIDE 2.5 MG: 2.5 TABLET, EXTENDED RELEASE ORAL at 09:08

## 2020-07-09 RX ADMIN — OMEPRAZOLE 20 MG: 20 CAPSULE, DELAYED RELEASE ORAL at 09:08

## 2020-07-09 NOTE — PHARMACY-ANTICOAGULATION SERVICE
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 INR Goal= 2-3  Expected length of therapy undetermined.    Warfarin PTA Regimen: 2.5 mg M,F and 3.75 mg ROW      Anticoagulation Dose History     Recent Dosing and Labs Latest Ref Rng & Units 7/8/2020 7/9/2020    FRANKIEZ IMS TEMPLATE - 3.5 mg -    INR 0.86 - 1.14 3.00(H) 3.47(H)          Recommend discharging the patient on his PTA  warfarin regimen of 2.5 mg M,F and 3.75 mg ROW The patient should have an INR checked in 1-2 days.

## 2020-07-09 NOTE — DISCHARGE SUMMARY
[facility] Outpatient / Observation Unit  Discharge Summary        Carlos Puente II MRN# [id number]   YOB: 1950 Age: 70 year old     Date of Admission:  7/8/2020  Date of Discharge:  7/9/2020 11:00 AM  Admitting Physician:  Shay Caballero MD  Discharge Physician: Susana Liao PA-C  Discharging Service: Hospitalist      Primary Provider: Romie Tiwari  Primary Care Physician Phone Number: [phone]         Primary Discharge Diagnoses:    Carlos Puente II was admitted on 7/8/2020 for episode of syncope.     1. Syncopal Episode: Suspect vaso-vagal in nature.          Secondary Discharge Diagnoses:   DM  HTN  HLP  CKD  CAD  GERD  DVT/PE on coumadin           Code Status:      Full Code        Brief Hospital Summary:        Reason for your hospital stay      You were admitted for concerns of passing out. Your labs and work up did   not show any evidence of infection or heart rhythm abnormality. Your ECHO   showed normal heart function. We suspect your symptoms are likely due to   dehydration. Increase your oral intake calos during this hot period.             Please refer to initial admission history and physical for further details.   Briefly, Carlos Puente II was admitted on 7/8/2020 for episode of syncope.  Initial work up in the ED did not reveal evidence of significant cardiac arrhthymias or neurologic abnormalities.  Pt was registered to the Observation Unit for further evaluation.      Pt was placed on telemetry and started on IVF hydration. ECHO was performed to r/o significant valvular dysfunction with results outlined below. Labs reviewed and significant results addressed. On the day of discharge, pt has not had any further episodes of syncope, no significant arrhythmias detected or concerning ECHO findings as the cause of syncope. Vitals were stable and pt was deemed safe for discharge. Medications were reviewed and adjustments made as necessary. Pt is instructed to follow up as  below.           Significant Lab During Hospitalization:      Recent Labs   Lab 20  1249   WBC 6.2   HGB 15.6   HCT 47.8   MCV 90   *     Recent Labs   Lab 20  0518 20  1249    137   POTASSIUM 4.5 4.4   CHLORIDE 111* 109   CO2 24 24   ANIONGAP 4 4   * 141*   BUN 23 22   CR 1.45* 1.35*   GFRESTIMATED 48* 52*   GFRESTBLACK 56* 61   ANABELL 8.8 9.4       Recent Labs   Lab 20  1249   HGB 15.6       Recent Labs   Lab 20  0518 20  1249   INR 3.47* 3.00*       Recent Labs   Lab 20  20520  1748 20  1249   TROPI <0.015 <0.015 <0.015              Significant Imaging During Hospitalization:      Results for orders placed or performed during the hospital encounter of 20   XR Chest 2 Views    Narrative    XR CHEST 2 VW 2020 1:09 PM    HISTORY: syncope    COMPARISON: None.      Impression    IMPRESSION: Mild pulmonary venous congestion. No consolidation. No  pleural effusions. No pneumothorax. Normal cardiac size. Median  sternotomy.    MOUSTAPHA PALMA MD   Echocardiogram Complete    Narrative    [id number]  [id number]  [id number]  [id number]^DANIELLA^MITESH^[facility]  Echocardiography Laboratory  [address] [place] [address]        Name: EVON PAREKH  MRN: [id number]  : 1950  Study Date: 2020 07:35 AM  Age: 70 yrs  Gender: Male  Patient Location: [facility]  Reason For Study: Syncope  Ordering Physician: MITESH BREWER  Performed By: Tahmina Mcintyre RDCS     BSA: 2.1 m2  Height: 70 in  Weight: 212 lb  HR: 51  BP: 155/73 mmHg  _____________________________________________________________________________  __        Procedure  Complete Portable Echo Adult. Optison (NDC #[id number]) given intravenously.  _____________________________________________________________________________  __        Interpretation Summary     The left ventricle is normal in structure, function and size.  There is normal left  ventricular wall thickness.  The right ventricle is normal size.  Mildly decreased right ventricular systolic function  Normal estimated RV pressures.  No significant valvular abnormalites  No likely cause of syncope identified.  _____________________________________________________________________________  __        Left Ventricle  The left ventricle is normal in structure, function and size. There is normal  left ventricular wall thickness. The left ventricular ejection fraction is  normal. Left ventricular diastolic function is normal. Diastolic Doppler  findings (E/E' ratio and/or other parameters) suggest left ventricular filling  pressures are indeterminate. No regional wall motion abnormalities noted.     Right Ventricle  The right ventricle is normal size. Mildly decreased right ventricular  systolic function.     Atria  Normal left atrial size. Right atrial size is normal. There is no atrial shunt  seen.     Mitral Valve  There is trace to mild mitral regurgitation.        Tricuspid Valve  There is trace tricuspid regurgitation. IVC diameter <2.1 cm collapsing >50%  with sniff suggests a normal RA pressure of 3 mmHg. The right ventricular  systolic pressure is approximated at 18mmHg plus the right atrial pressure.     Aortic Valve  There is trivial trileaflet aortic sclerosis. No aortic regurgitation is  present. No aortic stenosis is present.     Pulmonic Valve  There is trace to mild pulmonic valvular regurgitation.     Vessels  Normal size aorta.     Pericardium  There is no pericardial effusion.        Rhythm  Sinus rhythm was noted.  _____________________________________________________________________________  __  MMode/2D Measurements & Calculations     IVSd: 0.87 cm  LVIDd: 5.3 cm  LVIDs: 3.7 cm  LVPWd: 1.1 cm  FS: 29.7 %  LV mass(C)d: 197.3 grams  LV mass(C)dI: 92.2 grams/m2  Ao root diam: 3.4 cm  LA dimension: 4.4 cm  asc Aorta Diam: 3.6 cm  LA/Ao: 1.3  LA Volume (BP): 55.5 ml  LA Volume Index  (BP): 25.9 ml/m2  RWT: 0.41           Doppler Measurements & Calculations  MV E max pushpa: 86.3 cm/sec  MV A max pushpa: 67.9 cm/sec  MV E/A: 1.3  MV max P.8 mmHg  MV mean P.90 mmHg  MV V2 VTI: 32.6 cm  MV dec time: 0.21 sec  PA acc time: 0.11 sec  TR max pushpa: 211.8 cm/sec  TR max P.9 mmHg  E/E' avg: 10.4  Lateral E/e': 8.2  Medial E/e': 12.5              _____________________________________________________________________________  __        Report approved by: Douglas Jimenez 2020 09:49 AM                   Pending Results:        Unresulted Labs Ordered in the Past 30 Days of this Admission     Date and Time Order Name Status Description    2020 1405 Asymptomatic COVID-19 Virus (Coronavirus) by PCR In process               Consultations This Hospital Stay:      No consultations were requested during this admission         Discharge Instructions and Follow-Up:      Follow up with primary care provider, Romie Tiwari, within 7 days   for hospital follow- up.  No follow up labs or test are needed.            Discharge Disposition:      Discharged to home         Discharge Medications:        Discharge Medication List as of 2020 10:51 AM      CONTINUE these medications which have NOT CHANGED    Details   aspirin 81 MG EC tablet Take 81 mg by mouth daily, Historical      atorvastatin (LIPITOR) 40 MG tablet Take 40 mg by mouth At Bedtime, Historical      blood glucose (NO BRAND SPECIFIED) test strip USE AS DIRECTED, Historical      cholecalciferol (VITAMIN D3) 25 mcg (1000 units) capsule Take 1 capsule by mouth daily, Historical      glipiZIDE (GLUCOTROL XL) 2.5 MG 24 hr tablet Take 2.5 mg by mouth daily, Historical      lisinopril (ZESTRIL) 20 MG tablet Take 20 mg by mouth daily, Historical      omeprazole (PRILOSEC) 20 MG DR capsule Take 20 mg by mouth daily, Historical      terazosin (HYTRIN) 2 MG capsule Take 2 mg by mouth At Bedtime, Historical      !! warfarin ANTICOAGULANT  "(COUMADIN) 2.5 MG tablet Take 2.5 mg by mouth At Bedtime On Mondays and Fridays, Historical      !! warfarin ANTICOAGULANT (COUMADIN) 2.5 MG tablet Take 3.75 mg by mouth At Bedtime All other days, Historical       !! - Potential duplicate medications found. Please discuss with provider.              Allergies:         Allergies   Allergen Reactions     Atorvastatin      Other reaction(s): Muscle Weakness  PER PATIENT,WAS TOLD IT WAS EATING HIS MUSCLES , classic side effect of statins not an allergy     Penicillins      Other reaction(s): *Unknown - Childhood Rxn  Pt states,  \" he was told that when he was born he got a dose and had some stanley reaction;  Was told he was allergic to it.\"     Simvastatin Other (See Comments)     Weakness \" couldn't get out of chair; no muscle strength\" pt states. Side effect NOT an allergy           Condition and Physical on Discharge:      Discharge condition: Stable   Vitals: Blood pressure 135/74, pulse 55, temperature 96  F (35.6  C), temperature source Oral, resp. rate 20, weight 96.2 kg (212 lb), SpO2 97 %.  212 lbs 0 oz      GENERAL:  Comfortable.  PSYCH: pleasant, oriented, No acute distress.  HEENT:  PERRLA. Normal conjunctiva, normal hearing, nasal mucosa and Oropharynx are normal.  NECK:  Supple, no neck vein distention, adenopathy or bruits, normal thyroid.  HEART:  Normal S1, S2 with no murmur, no pericardial rub, gallops or S3 or S4.  LUNGS:  Clear to auscultation, normal Respiratory effort. No wheezing, rales or ronchi.  ABDOMEN:  Soft, no hepatosplenomegaly, normal bowel sounds. Non-tender, non distended.   EXTREMITIES:  No pedal edema, +2 pulses bilateral and equal.  SKIN:  Dry to touch, No rash, wound or ulcerations.  NEUROLOGIC:  CN 2-12 intact, BL 5/5 symmetric upper and lower extremity strength, sensation is intact with no focal deficits.     "

## 2020-07-09 NOTE — PLAN OF CARE
PRIMARY DIAGNOSIS: SYNCOPE  OUTPATIENT/OBSERVATION GOALS TO BE MET BEFORE DISCHARGE:  1. Orthostatic performed: N/A    2. Diagnostic testing complete & at baseline neurologic testing: Yes    3. Cleared by consultants (if involved): N/A    4. Interpretation of cardiac rhythm per telemetry tech: SB 50s    5. Tolerating adequate PO diet and medications: Yes    6. Return to near baseline physical activity or neurologic status: Yes    Discharge Planner Nurse   Safe discharge environment identified: Yes  Barriers to discharge: Yes       Entered by: Jenny Mao 07/09/2020 12:48 AM    VSS, pt is A&Ox4, up SBA, denies pain at this time, denies dizziness when out of bed, IVF @ 100, ECHO in the AM, trops neg x3, will continue to monitor       Please review provider order for any additional goals.   Nurse to notify provider when observation goals have been met and patient is ready for discharge.

## 2020-07-09 NOTE — PLAN OF CARE
PRIMARY DIAGNOSIS: SYNCOPE  OUTPATIENT/OBSERVATION GOALS TO BE MET BEFORE DISCHARGE:  1. Orthostatic performed: N/A    2. Diagnostic testing complete & at baseline neurologic testing: Yes    3. Cleared by consultants (if involved): N/A    4. Interpretation of cardiac rhythm per telemetry tech: SB 50s    5. Tolerating adequate PO diet and medications: Yes    6. Return to near baseline physical activity or neurologic status: Yes    Discharge Planner Nurse   Safe discharge environment identified: Yes  Barriers to discharge: Yes       Entered by: Jenny Mao 07/09/2020 4:52 AM    VSS, pt is A&Ox4, up SBA, SL, denies pain at this time, ECHO in the AM, tolerating a regular diet, will continue to monitor       Please review provider order for any additional goals.   Nurse to notify provider when observation goals have been met and patient is ready for discharge.

## 2020-07-09 NOTE — PLAN OF CARE
Patient's After Visit Summary was reviewed with patient.   Patient verbalized understanding of After Visit Summary, recommended follow up and was given an opportunity to ask questions.   Discharge medications sent home with patient/family: Not applicable   Discharged with spouse       OBSERVATION patient END time: 1100

## 2020-07-09 NOTE — PLAN OF CARE
PRIMARY DIAGNOSIS: SYNCOPE  OUTPATIENT/OBSERVATION GOALS TO BE MET BEFORE DISCHARGE:  1. Orthostatic performed: N/A    2. Diagnostic testing complete & at baseline neurologic testing: Yes    3. Cleared by consultants (if involved): N/A    4. Interpretation of cardiac rhythm per telemetry tech: SB-50s    5. Tolerating adequate PO diet and medications: Yes    6. Return to near baseline physical activity or neurologic status: Yes    Discharge Planner Nurse   Safe discharge environment identified: Yes  Barriers to discharge: Yes       Entered by: Jenny Mao 07/08/2020 8:51 PM    VSS, pt is A&Ox4, up SBA, IVF @ 100, denies pain at this time, denies dizziness when out of bed, ECHO in the AM, will continue to monitor     Please review provider order for any additional goals.   Nurse to notify provider when observation goals have been met and patient is ready for discharge.

## 2020-07-09 NOTE — PLAN OF CARE
PRIMARY DIAGNOSIS: SYNCOPE  OUTPATIENT/OBSERVATION GOALS TO BE MET BEFORE DISCHARGE:  1. Orthostatic performed: Yes:          Lying Orthostatic BP: 138/66         Sitting Orthostatic BP: 131/116         Standing Orthostatic BP: 139/72     2. Diagnostic testing complete & at baseline neurologic testing: N/A    3. Cleared by consultants (if involved): N/A    4. Interpretation of cardiac rhythm per telemetry tech: SB w/1st degree AVB    5. Tolerating adequate PO diet and medications: Yes    6. Return to near baseline physical activity or neurologic status: Yes    Discharge Planner Nurse   Safe discharge environment identified: Yes  Barriers to discharge: No       Entered by: Lexis Gallardo 07/09/2020 11:23 AM     Please review provider order for any additional goals.   Nurse to notify provider when observation goals have been met and patient is ready for discharge.    VSS,A/O. Independent in room. LS-clear.  Echo this am. Trops negative so far.  Denies CP/SOB/pain

## 2021-01-15 ENCOUNTER — HEALTH MAINTENANCE LETTER (OUTPATIENT)
Age: 71
End: 2021-01-15

## 2021-05-09 ENCOUNTER — HEALTH MAINTENANCE LETTER (OUTPATIENT)
Age: 71
End: 2021-05-09

## 2021-08-29 ENCOUNTER — HEALTH MAINTENANCE LETTER (OUTPATIENT)
Age: 71
End: 2021-08-29

## 2021-10-24 ENCOUNTER — HEALTH MAINTENANCE LETTER (OUTPATIENT)
Age: 71
End: 2021-10-24

## 2021-12-19 ENCOUNTER — HEALTH MAINTENANCE LETTER (OUTPATIENT)
Age: 71
End: 2021-12-19

## 2022-02-13 ENCOUNTER — HEALTH MAINTENANCE LETTER (OUTPATIENT)
Age: 72
End: 2022-02-13

## 2022-04-10 ENCOUNTER — HEALTH MAINTENANCE LETTER (OUTPATIENT)
Age: 72
End: 2022-04-10

## 2022-07-31 ENCOUNTER — HEALTH MAINTENANCE LETTER (OUTPATIENT)
Age: 72
End: 2022-07-31

## 2022-10-16 ENCOUNTER — HEALTH MAINTENANCE LETTER (OUTPATIENT)
Age: 72
End: 2022-10-16

## 2022-12-03 ENCOUNTER — HEALTH MAINTENANCE LETTER (OUTPATIENT)
Age: 72
End: 2022-12-03

## 2023-03-26 ENCOUNTER — HEALTH MAINTENANCE LETTER (OUTPATIENT)
Age: 73
End: 2023-03-26

## 2023-08-26 ENCOUNTER — HEALTH MAINTENANCE LETTER (OUTPATIENT)
Age: 73
End: 2023-08-26

## 2024-01-13 ENCOUNTER — HEALTH MAINTENANCE LETTER (OUTPATIENT)
Age: 74
End: 2024-01-13